# Patient Record
Sex: FEMALE | Race: WHITE
[De-identification: names, ages, dates, MRNs, and addresses within clinical notes are randomized per-mention and may not be internally consistent; named-entity substitution may affect disease eponyms.]

---

## 2018-11-19 ENCOUNTER — HOSPITAL ENCOUNTER (EMERGENCY)
Dept: HOSPITAL 62 - ER | Age: 23
Discharge: HOME | End: 2018-11-19
Payer: SELF-PAY

## 2018-11-19 VITALS — SYSTOLIC BLOOD PRESSURE: 132 MMHG | DIASTOLIC BLOOD PRESSURE: 87 MMHG

## 2018-11-19 DIAGNOSIS — Z79.899: ICD-10-CM

## 2018-11-19 DIAGNOSIS — R11.0: ICD-10-CM

## 2018-11-19 DIAGNOSIS — R51: Primary | ICD-10-CM
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
## 2018-11-19 PROCEDURE — 99283 EMERGENCY DEPT VISIT LOW MDM: CPT

## 2018-11-19 NOTE — ER DOCUMENT REPORT
HPI





- HPI


Patient complains to provider of: Headache, nausea


Time Seen by Provider: 11/19/18 00:42


Pain Level: 4


Context: 





Patient is a 23-year-old female presenting to the emergency department 

complaining of a headache and nausea.  Patient states that the right side of 

her face on her car door she was opening it on accident.  Patient denies loss 

of consciousness or vomiting at that time.  Patient states she has had 

intermittent headaches for the last couple of months.  States as a child she 

was told she had migraines but has never seen a neurologist.  Patient states 

prior to arrival she was in tears because the pain on the right side of her 

face and into her right head was so excruciating.  States she took Goody power 

prior to arrival.  Patient now states the pain has somewhat resolved.  Patient 

denies any change in vision, vomiting, numbness or tingling in any extremity.





Past medical history: Depression, anxiety


Medication: Fluoxetine, citalopram


Allergies: None


Surgical history: None





Patient denies cigarette smoking, denies illicit drug use, admits to occasional 

EtOH use.





Past Medical History





- General


Information source: Patient





- Social History


Smoking Status: Never Smoker


Frequency of alcohol use: Occasional


Lives with: Family


Family History: Reviewed & Not Pertinent


Neurological Medical History: Reports: Hx Migraine





- Immunizations


Immunizations up to date: Yes





Vertical Provider Document





- CONSTITUTIONAL


Agree With Documented VS: Yes


Notes: 





GENERAL: Alert, interacts well. No acute distress.


HEAD: Normocephalic, atraumatic.  No point tenderness upon palpation right face

, right temple.  When asked her pain is patient points to her entire right face 

and right side of her head.


EYES: Pupils equal, round, and reactive to light. Extraocular movements intact.


ENT: Oral mucosa moist, tongue midline. Nares patent, no nasal septal hematoma, 

TM's intact, no hemotympanum.


NECK: Full range of motion. Supple. Trachea midline.


LUNGS: Clear to auscultation bilaterally, no wheezes, rales, or rhonchi. No 

respiratory distress.


HEART: Regular rate and rhythm. No murmur


ABDOMEN: Obese soft, non-tender. Non-distended. Bowel sounds present in all 4 

quadrants.


EXTREMITIES: Moves all 4 extremities spontaneously. No edema, normal radial and 

dorsalis pedis pulses bilaterally. No cyanosis.  5 out of 5 strength all 4 

extremities.


BACK: no cervical, thoracic, lumbar midline tenderness. No saddle anesthesia, 

normal distal neurovascular exam. 


NEUROLOGICAL: Alert and oriented x3. Normal speech. cranial nerves II through 

XII grossly intact. 


PSYCH: Normal affect, normal mood.


SKIN: Warm, dry, normal turgor. No rashes or lesions noted.








- INFECTION CONTROL


TRAVEL OUTSIDE OF THE [place] IN LAST 30 DAYS: No





Course





- Re-evaluation


Re-evalutation: 


Patient does not meet criteria for CT at this time.  Headache could be chronic 

from her migraines or new onset from the trauma she sustained to the right side 

of her head yesterday.  Potentially postconcussive syndrome.


11/19/18 01:03


Discussed with patient at length treatment for her headache.  She then starts 

to cry stating that she does not have insurance.  States now upon arriving to 

the emergency room her headache is not as bad as it was at home.  States she 

did take Goody powder prior to arrival.  States she wishes for prescriptions 

for medications and not to be treated in the emergency room because she does 

not have insurance and she is worried about the expense of this visit.





Discussed treatment with IV fluids, Benadryl, Toradol, antinausea medications.  

Patient states she has been drinking plenty of fluids, and can take Benadryl 

over-the-counter.  She states she will take Tylenol or Motrin.





Offered the patient multiple times headache/migraine treatment in the emergency 

room but she continues to wish to deny.  Neuro exam is within normal limits, no 

deficits noted.





Vital signs reviewed, nursing notes reviewed.


Patient is nontoxic at this time.  Drinking water although stating she is 

nauseous.





Discussed with patient the good Rx application on her smart phone in order to 

get prescription medications at a discounted price.





Patient stops crying and is extremely appreciative of the time I have spent 

discussing headache treatments with her.  Patient states she has an appointment 

at the [facility] on Thursday.





Vitals reviewed: Nursing notes reviewed.








Discharge





- Discharge


Clinical Impression: 


 Nausea





Headache


Qualifiers:


 Headache type: unspecified Headache chronicity pattern: acute headache 

Intractability: not intractable Qualified Code(s): R51 - Headache





Condition: Stable


Disposition: HOME, SELF-CARE


Instructions:  Headache ([facility]), Nausea or Vomiting, Nonspecific ([facility])


Additional Instructions: 


As we discussed you have been seen and treated in the emergency department for 

headache and nausea.  You should take medications as prescribed.  Please follow-

up with [facility] at your earliest convenience.  Please return to the 

emergency room for any other concerning symptoms.


Prescriptions: 


Ketorolac Tromethamine [Toradol 10 mg Tablet] 10 mg PO Q8HP PRN #24 tablet


 PRN Reason: 


Ondansetron [Zofran Odt 4 mg Tablet] 1 - 2 tab PO Q4H PRN #15 tab.rapdis


 PRN Reason: For Nausea/Vomiting


Forms:  Return to Work